# Patient Record
Sex: FEMALE | Race: WHITE
[De-identification: names, ages, dates, MRNs, and addresses within clinical notes are randomized per-mention and may not be internally consistent; named-entity substitution may affect disease eponyms.]

---

## 2019-12-13 ENCOUNTER — HOSPITAL ENCOUNTER (EMERGENCY)
Dept: HOSPITAL 63 - ER | Age: 29
Discharge: HOME | End: 2019-12-13
Payer: COMMERCIAL

## 2019-12-13 VITALS — SYSTOLIC BLOOD PRESSURE: 140 MMHG | DIASTOLIC BLOOD PRESSURE: 82 MMHG

## 2019-12-13 VITALS — BODY MASS INDEX: 39.65 KG/M2 | WEIGHT: 210 LBS | HEIGHT: 61 IN

## 2019-12-13 DIAGNOSIS — L50.9: Primary | ICD-10-CM

## 2019-12-13 DIAGNOSIS — Z88.0: ICD-10-CM

## 2019-12-13 DIAGNOSIS — Z91.013: ICD-10-CM

## 2019-12-13 DIAGNOSIS — J45.909: ICD-10-CM

## 2019-12-13 PROCEDURE — 99283 EMERGENCY DEPT VISIT LOW MDM: CPT

## 2019-12-13 NOTE — PHYS DOC
Past History


Past Medical History:  Asthma


Past Surgical History:  , Tubal ligation


Smoking:  Non-smoker


Alcohol Use:  None


Drug Use:  None





Adult General


Chief Complaint


Chief Complaint:  ALLERGIC REACTION





HPI


HPI





Patient is a 29-year-old female presents complaining of a rash on all 4 

extremities, chest, abdomen, and back. This started approximately 2 days ago, 

after she traveled to [place] and stayed at a hotel. This started after waking 

up, having been in the sheets overnight. She does get relief with Benadryl. 

There is no difficulty breathing, and no nausea or vomiting. The rash is itchy. 

There is no drainage from the rash. Symptoms are moderate to severe in 

intensity.[]





Review of Systems


Review of Systems





Constitutional: Denies fever or chills []


Eyes: Denies change in visual acuity, redness, or eye pain []


HENT: Denies nasal congestion or sore throat []


Respiratory: Denies cough or shortness of breath []


Cardiovascular: No chest pain or palpitations[]


GI: Denies abdominal pain, nausea, vomiting, bloody stools or diarrhea []


: Denies dysuria or hematuria []


Musculoskeletal: Denies back pain or joint pain []


Integument: See history of present illness[]


Neurologic: Denies headache, focal weakness or sensory changes []


Endocrine: Denies polyuria or polydipsia []





All other systems were reviewed and found to be within normal limits, except as 

documented in this note.





Allergies


Allergies





Allergies








Coded Allergies Type Severity Reaction Last Updated Verified


 


  Penicillins Allergy Unknown  19 Yes


 


  shellfish derived Allergy Unknown  19 Yes











Physical Exam


Physical Exam





Constitutional: Well developed, well nourished, no acute distress, non-toxic 

appearance. []


HENT: Normocephalic, atraumatic, bilateral external ears normal, oropharynx 

moist, no oral exudates, nose normal. []


Eyes: PERRLA, EOMI, conjunctiva normal, no discharge. [] 


Neck: Normal range of motion, no tenderness, supple, no stridor. [] 


Cardiovascular:Heart rate regular rhythm, no murmur []


Lungs & Thorax:  Bilateral breath sounds clear to auscultation []


Abdomen: Bowel sounds normal, soft, no tenderness, no masses, no pulsatile 

masses. [] 


Skin: Warm, dry, erythematous rash on all 4 extremities, chest, abdomen, and 

back. There are no petechiae. No excoriations. No drainage. No inguinal or 

axillary lymphadenopathy. No palm or sole involvement. [] 


Back: No tenderness, no CVA tenderness. [] 


Extremities: No tenderness, no cyanosis, no clubbing, ROM intact, no edema. [] 


Neurologic: Alert and oriented X 3, normal motor function, normal sensory 

function, no focal deficits noted. []


Psychologic: Affect normal, judgement normal, mood normal. []





Current Patient Data


Vital Signs





                                   Vital Signs








  Date Time  Temp Pulse Resp B/P (MAP) Pulse Ox O2 Delivery O2 Flow Rate FiO2


 


19 10:57 97.7 91 18  97 Room Air  











EKG


EKG


[]





Radiology/Procedures


Radiology/Procedures


[]





Course & Med Decision Making


Course & Med Decision Making


Pertinent Labs and Imaging studies reviewed. (See chart for details)





ED course: Patient arrived, was placed in bed, and tolerated exam well. Findings

 and plan were discussed with patient and her mother who voiced understanding. 

All questions were answered. She was discharged in improved condition.





Medical decision making: There is no evidence of anaphylaxis. No evidence of 

toxic epidermal necrolysis, no staph scalded skin syndrome, no Levine-Burton 

syndrome.[]





Dragon Disclaimer


Dragon Disclaimer


This electronic medical record was generated, in whole or in part, using a voice

 recognition dictation system.





Departure


Departure:


Impression:  


   Primary Impression:  


   Urticaria


Disposition:  01 HOME, SELF-CARE


Condition:  IMPROVED


Referrals:  


PCP,NO (PCP)


Patient Instructions:  Rash





Additional Instructions:  


Stop the Benadryl, take the new medications as prescribed. Follow-up with your 

regular doctor in 2 days. If you do not have regular doctor list of local 

clinics will be provided. Return to the emergency department if worsening rash, 

difficulty breathing, or any other concerns.


Scripts


Prednisone (PREDNISONE) 50 Mg Tablet


1 TAB PO DAILY for INFLAMMATION, #5 TAB


   Prov: MARY MOULTON DO         19 


Famotidine (PEPCID) 20 Mg Tablet


1 TAB PO BID for allergic reaction, #20 TAB 0 Refills


   Prov: MARY MOULTON DO         19 


Hydroxyzine Hcl (HYDROXYZINE HCL) 25 Mg Tablet


1 TAB PO TID for allergic reaction, #30 TAB


   Prov: MARY MOULTNO DO         19











MARY MOULTON DO          Dec 13, 2019 11:19

## 2020-01-25 ENCOUNTER — HOSPITAL ENCOUNTER (EMERGENCY)
Dept: HOSPITAL 63 - ER | Age: 30
Discharge: HOME | End: 2020-01-25
Payer: COMMERCIAL

## 2020-01-25 VITALS — DIASTOLIC BLOOD PRESSURE: 84 MMHG | SYSTOLIC BLOOD PRESSURE: 134 MMHG

## 2020-01-25 VITALS — WEIGHT: 219.58 LBS | HEIGHT: 61 IN | BODY MASS INDEX: 41.46 KG/M2

## 2020-01-25 DIAGNOSIS — R19.7: ICD-10-CM

## 2020-01-25 DIAGNOSIS — B34.9: Primary | ICD-10-CM

## 2020-01-25 DIAGNOSIS — J45.909: ICD-10-CM

## 2020-01-25 DIAGNOSIS — R11.2: ICD-10-CM

## 2020-01-25 DIAGNOSIS — Z98.51: ICD-10-CM

## 2020-01-25 PROCEDURE — 99284 EMERGENCY DEPT VISIT MOD MDM: CPT

## 2020-01-25 PROCEDURE — 71046 X-RAY EXAM CHEST 2 VIEWS: CPT

## 2020-01-25 PROCEDURE — 94640 AIRWAY INHALATION TREATMENT: CPT

## 2020-01-25 NOTE — PHYS DOC
Past History


Past Medical History:  Asthma


Past Surgical History:  , Tubal ligation


Smoking:  Non-smoker


Alcohol Use:  Rarely


Drug Use:  None





Adult General


Chief Complaint


Chief Complaint:  Influenza





HPI


HPI





Patient is a 29-year-old female that presents to the ED with worsening URI 

symptoms. Patient states that she is diagnosed with fluid that a on Thursday 

after experiencing fever, myalgias, and productive cough.  Patient was given 

Tamiflu but since reports having increased chest congestion, wheezing, shortness

of breath, nausea, vomiting, and diarrhea. Patient reports this morning she 

became short of breath and wheezing her lips and fingertips turned purple. She 

states that this is a common presentation of her exacerbation of asthma. She 

needs her inhaler at 10 AM this morning with minimal relief.  Denies pregnancy 

as history of tubal ligation and that she is currently on her menstrual period.





Review of Systems


Review of Systems





Constitutional: Denies fever or chills 


Eyes: Denies redness or eye pain 


HENT: Denies nasal congestion or sore throat


Respiratory: Reports shortness of breath, cough, and wheezing.


Cardiovascular: Denies chest pain or palpitations. Reports chest congestion


GI: Denies abdominal pain, reports nausea and vomiting.


: Denies dysuria or hematuria


Musculoskeletal: Denies back pain or joint pain. Force myalgias


Integument: Denies rash or skin lesions 


Neurologic: Denies headache, focal weakness or sensory changes





Complete systems were reviewed and found to be within normal limits, except as 

documented in this note.





Current Medications


Current Medications





Current Medications








 Medications


  (Trade)  Dose


 Ordered  Sig/Lei  Start Time


 Stop Time Status Last Admin


Dose Admin


 


 Dexamethasone


  (Decadron)  10 mg  1X  ONCE  20 12:15


 20 12:16  20 12:12


10 MG











Allergies


Allergies





Allergies








Coded Allergies Type Severity Reaction Last Updated Verified


 


  Penicillins Allergy Unknown  19 Yes


 


  shellfish derived Allergy Unknown  19 Yes











Physical Exam


Physical Exam


Constitutional: Well developed, well nourished, no acute distress, non-toxic 

appearance


HENT: Normocephalic, atraumatic, oropharynx moist, TM clear, pharynx without 

exudate or erythema


Eyes: Conjunctiva normal, no discharge


Neck: Normal range of motion, no tenderness, supple


Cardiovascular: Heart rate normal, regular rhythm


Lungs & Thorax: Diffuse inspiratory and expiratory wheezing in all lung fields. 

No stridor, no crackles.


Abdomen: Soft, no tenderness


Skin: Warm, dry, no erythema, no rash


Extremities: No tenderness, ROM intact, no edema


Neurologic: Alert and oriented X 3, no focal deficits noted


Psychologic: Affect normal, judgement normal





Current Patient Data


Vital Signs





                                   Vital Signs








  Date Time  Temp Pulse Resp B/P (MAP) Pulse Ox O2 Delivery O2 Flow Rate FiO2


 


20 11:55 98.0 106 24 121/84 (96) 95 Room Air  











EKG


EKG


[]





Radiology/Procedures


Radiology/Procedures


PROCEDURE: CHEST PA & LATERAL





CHEST PA   LATERAL


 


History: Cough


 


Comparison: None.


 


Findings:


No consolidation or pleural effusion. Normal heart size. No pneumothorax.


 


Impression: 


1.  No acute cardiopulmonary process.


 


Electronically signed by: Harley Sparks DO (2020 12:30 PM) [facility]





Course & Med Decision Making


Course & Med Decision Making


Pertinent Labs and Imaging studies reviewed. (See chart for details)





Patient presents with a history of an influenza A diagnosis and history of 

asthma presents with worsening fever, wheezing, nausea, and vomiting Thursday. 

Symptomatic treatment provided including respiratory neb and steroid. Nausea 

addressed. Chest x-ray without acute process. 











Patient stable for discharge with outpatient follow-up with PCP. Discussed 

findings and plan with patient, who acknowledges understanding and agreement.





Dragon Disclaimer


Dragon Disclaimer


This electronic medical record was generated, in whole or in part, using a voice

 recognition dictation system.





Departure


Departure:


Impression:  


   Primary Impression:  


   Viral syndrome


   Additional Impressions:  


   Asthma


   Nausea & vomiting


   Diarrhea


Disposition:   HOME, SELF-CARE


Condition:  IMPROVED


Referrals:  


NON,STAFF (PCP)


Patient Instructions:  Asthma, Adult, Easy-to-Read, Diarrhea, Easy-to-Read, Diet

 for Diarrhea, Adult, Nausea and Vomiting, Easy-to-Read, Viral Syndrome


Scripts


Ondansetron (ONDANSETRON ODT) 4 Mg Tab.rapdis


1 TAB PO PRN Q6-8HRS PRN for NAUSEA, #16 TAB


   Prov: ISABEL CAGE DO         20 


Benzonatate (TESSALON PERLE) 100 Mg Capsule


1 CAP PO TID PRN for COUGH, #21 CAP


   Prov: ISABEL CAGE DO         20 


Guaifenesin/Codeine Phosphate (GUAIFENESIN-CODEINE SYRUP) 118 Ml Liquid


10 ML PO Q4HRS PRN for COUGH, #240 ML


   Prov: ISABEL CAGE DO         20 


Prednisone (PREDNISONE) 20 Mg Tablet


2 TAB PO DAILY for Asthma, #8 TAB


   Start this prescription tomorrow, 2020


   Prov: ISABEL CAGE DO         20





Problem Qualifiers








   Additional Impressions:  


   Asthma


   Asthma severity:  mild  Asthma persistence:  intermittent  Asthma 

   complication type:  with acute exacerbation  Qualified Codes:  J45.21 - Mild 

   intermittent asthma with (acute) exacerbation


   Nausea & vomiting


   Vomiting type:  unspecified  Vomiting Intractability:  non-intractable  

   Qualified Codes:  R11.2 - Nausea with vomiting, unspecified


   Diarrhea


   Diarrhea type:  unspecified type  Qualified Codes:  R19.7 - Diarrhea, 

   unspecified








ISABEL CAGE DO             2020 12:21

## 2020-01-25 NOTE — RAD
CHEST PA   LATERAL

 

History: Cough

 

Comparison: None.

 

Findings:

No consolidation or pleural effusion. Normal heart size. No pneumothorax.

 

Impression: 

1.  No acute cardiopulmonary process.

 

Electronically signed by: Harley Sparks DO (1/25/2020 12:30 PM) [facility]

## 2020-04-25 ENCOUNTER — HOSPITAL ENCOUNTER (EMERGENCY)
Dept: HOSPITAL 63 - ER | Age: 30
Discharge: HOME | End: 2020-04-25
Payer: COMMERCIAL

## 2020-04-25 VITALS — HEIGHT: 61 IN | WEIGHT: 213.41 LBS | BODY MASS INDEX: 40.29 KG/M2

## 2020-04-25 VITALS — DIASTOLIC BLOOD PRESSURE: 78 MMHG | SYSTOLIC BLOOD PRESSURE: 121 MMHG

## 2020-04-25 DIAGNOSIS — J45.909: ICD-10-CM

## 2020-04-25 DIAGNOSIS — Z98.51: ICD-10-CM

## 2020-04-25 DIAGNOSIS — Z88.0: ICD-10-CM

## 2020-04-25 DIAGNOSIS — Z98.890: ICD-10-CM

## 2020-04-25 DIAGNOSIS — N12: Primary | ICD-10-CM

## 2020-04-25 DIAGNOSIS — Z91.013: ICD-10-CM

## 2020-04-25 LAB
ALBUMIN SERPL-MCNC: 3.8 G/DL (ref 3.4–5)
ALBUMIN/GLOB SERPL: 0.8 {RATIO} (ref 1–1.7)
ALP SERPL-CCNC: 114 U/L (ref 46–116)
ALT SERPL-CCNC: 24 U/L (ref 14–59)
ANION GAP SERPL CALC-SCNC: 9 MMOL/L (ref 6–14)
APTT PPP: (no result) S
AST SERPL-CCNC: 19 U/L (ref 15–37)
BACTERIA #/AREA URNS HPF: 0 /HPF
BASOPHILS # BLD AUTO: 0.1 X10^3/UL (ref 0–0.2)
BASOPHILS NFR BLD: 1 % (ref 0–3)
BILIRUB SERPL-MCNC: 0.2 MG/DL (ref 0.2–1)
BILIRUB UR QL STRIP: (no result)
BUN/CREAT SERPL: 12 (ref 6–20)
CA-I SERPL ISE-MCNC: 12 MG/DL (ref 7–20)
CALCIUM SERPL-MCNC: 9.1 MG/DL (ref 8.5–10.1)
CHLORIDE SERPL-SCNC: 102 MMOL/L (ref 98–107)
CO2 SERPL-SCNC: 25 MMOL/L (ref 21–32)
CREAT SERPL-MCNC: 1 MG/DL (ref 0.6–1)
EOSINOPHIL NFR BLD: 0.3 X10^3/UL (ref 0–0.7)
EOSINOPHIL NFR BLD: 2 % (ref 0–3)
ERYTHROCYTE [DISTWIDTH] IN BLOOD BY AUTOMATED COUNT: 15.1 % (ref 11.5–14.5)
FIBRINOGEN PPP-MCNC: (no result) MG/DL
GFR SERPLBLD BASED ON 1.73 SQ M-ARVRAT: 65.6 ML/MIN
GLOBULIN SER-MCNC: 4.5 G/DL (ref 2.2–3.8)
GLUCOSE SERPL-MCNC: 74 MG/DL (ref 70–99)
GLUCOSE UR STRIP-MCNC: (no result) MG/DL
HCT VFR BLD CALC: 40.9 % (ref 36–47)
HGB BLD-MCNC: 13.3 G/DL (ref 12–15.5)
LIPASE: 67 U/L (ref 73–393)
LYMPHOCYTES # BLD: 1.6 X10^3/UL (ref 1–4.8)
LYMPHOCYTES NFR BLD AUTO: 13 % (ref 24–48)
MAGNESIUM SERPL-MCNC: 2 MG/DL (ref 1.8–2.4)
MCH RBC QN AUTO: 27 PG (ref 25–35)
MCHC RBC AUTO-ENTMCNC: 33 G/DL (ref 31–37)
MCV RBC AUTO: 82 FL (ref 79–100)
MONO #: 0.7 X10^3/UL (ref 0–1.1)
MONOCYTES NFR BLD: 6 % (ref 0–9)
NEUT #: 9.7 X10^3UL (ref 1.8–7.7)
NEUTROPHILS NFR BLD AUTO: 79 % (ref 31–73)
NITRITE UR QL STRIP: (no result)
PLATELET # BLD AUTO: 315 X10^3/UL (ref 140–400)
POTASSIUM SERPL-SCNC: 3.9 MMOL/L (ref 3.5–5.1)
PROT SERPL-MCNC: 8.3 G/DL (ref 6.4–8.2)
RBC # BLD AUTO: 4.99 X10^6/UL (ref 3.5–5.4)
RBC #/AREA URNS HPF: >40 /HPF (ref 0–2)
SODIUM SERPL-SCNC: 136 MMOL/L (ref 136–145)
SP GR UR STRIP: (no result)
SQUAMOUS #/AREA URNS LPF: (no result) /LPF
U PREG PATIENT: NEGATIVE
UROBILINOGEN UR-MCNC: (no result) MG/DL
WBC # BLD AUTO: 12.4 X10^3/UL (ref 4–11)
WBC #/AREA URNS HPF: >40 /HPF (ref 0–4)

## 2020-04-25 PROCEDURE — 96374 THER/PROPH/DIAG INJ IV PUSH: CPT

## 2020-04-25 PROCEDURE — 74176 CT ABD & PELVIS W/O CONTRAST: CPT

## 2020-04-25 PROCEDURE — 83735 ASSAY OF MAGNESIUM: CPT

## 2020-04-25 PROCEDURE — 85025 COMPLETE CBC W/AUTO DIFF WBC: CPT

## 2020-04-25 PROCEDURE — 81025 URINE PREGNANCY TEST: CPT

## 2020-04-25 PROCEDURE — 83605 ASSAY OF LACTIC ACID: CPT

## 2020-04-25 PROCEDURE — 83690 ASSAY OF LIPASE: CPT

## 2020-04-25 PROCEDURE — 87086 URINE CULTURE/COLONY COUNT: CPT

## 2020-04-25 PROCEDURE — 36415 COLL VENOUS BLD VENIPUNCTURE: CPT

## 2020-04-25 PROCEDURE — 99284 EMERGENCY DEPT VISIT MOD MDM: CPT

## 2020-04-25 PROCEDURE — 81001 URINALYSIS AUTO W/SCOPE: CPT

## 2020-04-25 PROCEDURE — 80053 COMPREHEN METABOLIC PANEL: CPT

## 2020-04-25 PROCEDURE — 96375 TX/PRO/DX INJ NEW DRUG ADDON: CPT
